# Patient Record
Sex: FEMALE | Race: WHITE | NOT HISPANIC OR LATINO | Employment: OTHER | URBAN - METROPOLITAN AREA
[De-identification: names, ages, dates, MRNs, and addresses within clinical notes are randomized per-mention and may not be internally consistent; named-entity substitution may affect disease eponyms.]

---

## 2023-09-16 ENCOUNTER — TELEPHONE (OUTPATIENT)
Dept: PRIMARY CARE | Facility: CLINIC | Age: 38
End: 2023-09-16

## 2023-09-16 ENCOUNTER — OFFICE VISIT (OUTPATIENT)
Dept: PRIMARY CARE | Facility: CLINIC | Age: 38
End: 2023-09-16
Payer: COMMERCIAL

## 2023-09-16 VITALS
BODY MASS INDEX: 25.62 KG/M2 | WEIGHT: 155.6 LBS | DIASTOLIC BLOOD PRESSURE: 68 MMHG | HEART RATE: 66 BPM | OXYGEN SATURATION: 99 % | SYSTOLIC BLOOD PRESSURE: 104 MMHG

## 2023-09-16 DIAGNOSIS — R21 RASH: Primary | ICD-10-CM

## 2023-09-16 DIAGNOSIS — G43.C0 PERIODIC HEADACHE SYNDROME, NOT INTRACTABLE: ICD-10-CM

## 2023-09-16 PROBLEM — M65.939 TENOSYNOVITIS OF WRIST: Status: ACTIVE | Noted: 2023-09-16

## 2023-09-16 PROBLEM — N83.299 OVARIAN CYST, COMPLEX: Status: ACTIVE | Noted: 2023-09-16

## 2023-09-16 PROBLEM — R87.619 ABNORMAL PAP SMEAR OF CERVIX: Status: ACTIVE | Noted: 2023-09-16

## 2023-09-16 PROBLEM — J02.9 SORE THROAT: Status: RESOLVED | Noted: 2023-09-16 | Resolved: 2023-09-16

## 2023-09-16 PROBLEM — L70.9 ACNE: Status: ACTIVE | Noted: 2023-09-16

## 2023-09-16 PROBLEM — F43.9 STRESS REACTION, EMOTIONAL: Status: ACTIVE | Noted: 2023-09-16

## 2023-09-16 PROBLEM — R11.0 NAUSEA IN ADULT: Status: RESOLVED | Noted: 2023-09-16 | Resolved: 2023-09-16

## 2023-09-16 PROBLEM — R05.9 COUGH: Status: ACTIVE | Noted: 2023-09-16

## 2023-09-16 PROBLEM — F41.8 DEPRESSION WITH ANXIETY: Status: ACTIVE | Noted: 2023-09-16

## 2023-09-16 PROBLEM — M25.561 RIGHT KNEE PAIN: Status: ACTIVE | Noted: 2023-09-16

## 2023-09-16 PROBLEM — J98.01 ACUTE BRONCHOSPASM DUE TO VIRAL INFECTION: Status: ACTIVE | Noted: 2023-09-16

## 2023-09-16 PROBLEM — R05.8 POST-VIRAL COUGH SYNDROME: Status: RESOLVED | Noted: 2023-09-16 | Resolved: 2023-09-16

## 2023-09-16 PROBLEM — J02.9 ACUTE VIRAL PHARYNGITIS: Status: ACTIVE | Noted: 2023-09-16

## 2023-09-16 PROBLEM — M79.18 MYOFASCIAL PAIN SYNDROME: Status: ACTIVE | Noted: 2023-09-16

## 2023-09-16 PROBLEM — J02.9 ACUTE VIRAL PHARYNGITIS: Status: RESOLVED | Noted: 2023-09-16 | Resolved: 2023-09-16

## 2023-09-16 PROBLEM — J02.9 SORE THROAT: Status: ACTIVE | Noted: 2023-09-16

## 2023-09-16 PROBLEM — U07.1 COVID-19: Status: RESOLVED | Noted: 2023-09-16 | Resolved: 2023-09-16

## 2023-09-16 PROBLEM — B34.9 ACUTE BRONCHOSPASM DUE TO VIRAL INFECTION: Status: ACTIVE | Noted: 2023-09-16

## 2023-09-16 PROBLEM — J98.01 ACUTE BRONCHOSPASM DUE TO VIRAL INFECTION: Status: RESOLVED | Noted: 2023-09-16 | Resolved: 2023-09-16

## 2023-09-16 PROBLEM — F41.8 SITUATIONAL ANXIETY: Status: ACTIVE | Noted: 2023-09-16

## 2023-09-16 PROBLEM — R05.9 COUGH: Status: RESOLVED | Noted: 2023-09-16 | Resolved: 2023-09-16

## 2023-09-16 PROBLEM — R05.8 POST-VIRAL COUGH SYNDROME: Status: ACTIVE | Noted: 2023-09-16

## 2023-09-16 PROBLEM — R11.0 NAUSEA IN ADULT: Status: ACTIVE | Noted: 2023-09-16

## 2023-09-16 PROBLEM — G43.909 MIGRAINE HEADACHE: Status: ACTIVE | Noted: 2023-09-16

## 2023-09-16 PROBLEM — M65.9 TENOSYNOVITIS OF WRIST: Status: ACTIVE | Noted: 2023-09-16

## 2023-09-16 PROBLEM — B34.9 ACUTE BRONCHOSPASM DUE TO VIRAL INFECTION: Status: RESOLVED | Noted: 2023-09-16 | Resolved: 2023-09-16

## 2023-09-16 PROBLEM — U07.1 COVID-19: Status: ACTIVE | Noted: 2023-09-16

## 2023-09-16 PROCEDURE — 99213 OFFICE O/P EST LOW 20 MIN: CPT | Performed by: STUDENT IN AN ORGANIZED HEALTH CARE EDUCATION/TRAINING PROGRAM

## 2023-09-16 PROCEDURE — 1036F TOBACCO NON-USER: CPT | Performed by: STUDENT IN AN ORGANIZED HEALTH CARE EDUCATION/TRAINING PROGRAM

## 2023-09-16 RX ORDER — METHYLPREDNISOLONE 4 MG/1
TABLET ORAL
Qty: 21 TABLET | Refills: 0 | Status: SHIPPED | OUTPATIENT
Start: 2023-09-16 | End: 2023-09-27 | Stop reason: ALTCHOICE

## 2023-09-16 RX ORDER — PROPRANOLOL HYDROCHLORIDE 10 MG/1
10 TABLET ORAL 3 TIMES DAILY
COMMUNITY
Start: 2023-01-24 | End: 2023-09-16 | Stop reason: SDUPTHER

## 2023-09-16 RX ORDER — PROPRANOLOL HYDROCHLORIDE 10 MG/1
10 TABLET ORAL 3 TIMES DAILY
Qty: 90 TABLET | Refills: 2 | Status: SHIPPED | OUTPATIENT
Start: 2023-09-16

## 2023-09-16 RX ORDER — ONDANSETRON 4 MG/1
4 TABLET, ORALLY DISINTEGRATING ORAL EVERY 8 HOURS PRN
Qty: 20 TABLET | Refills: 2 | Status: SHIPPED | OUTPATIENT
Start: 2023-09-16

## 2023-09-16 RX ORDER — DROSPIRENONE AND ETHINYL ESTRADIOL 0.02-3(28)
1 KIT ORAL
COMMUNITY
Start: 2018-05-22 | End: 2023-09-16 | Stop reason: ALTCHOICE

## 2023-09-16 RX ORDER — SUMATRIPTAN SUCCINATE 100 MG/1
100 TABLET ORAL DAILY PRN
COMMUNITY
Start: 2022-05-11 | End: 2023-09-16 | Stop reason: SDUPTHER

## 2023-09-16 RX ORDER — FERROUS SULFATE 325(65) MG
65 TABLET, DELAYED RELEASE (ENTERIC COATED) ORAL 2 TIMES DAILY
COMMUNITY
Start: 2019-06-08 | End: 2023-09-16 | Stop reason: ALTCHOICE

## 2023-09-16 RX ORDER — DOCUSATE SODIUM 100 MG/1
100 CAPSULE, LIQUID FILLED ORAL 2 TIMES DAILY
COMMUNITY
Start: 2019-06-08 | End: 2023-09-16 | Stop reason: ALTCHOICE

## 2023-09-16 RX ORDER — IBUPROFEN 400 MG/1
400 TABLET ORAL EVERY 6 HOURS
COMMUNITY
Start: 2019-06-08

## 2023-09-16 RX ORDER — NAPROXEN 500 MG/1
500 TABLET ORAL
COMMUNITY
Start: 2022-05-11 | End: 2023-09-27 | Stop reason: ALTCHOICE

## 2023-09-16 RX ORDER — LEVONORGESTREL 52 MG/1
1 INTRAUTERINE DEVICE INTRAUTERINE ONCE
COMMUNITY

## 2023-09-16 RX ORDER — SUMATRIPTAN SUCCINATE 100 MG/1
100 TABLET ORAL DAILY PRN
Qty: 9 TABLET | Refills: 11 | Status: SHIPPED | OUTPATIENT
Start: 2023-09-16

## 2023-09-16 RX ORDER — OXYCODONE AND ACETAMINOPHEN 5; 325 MG/1; MG/1
1 TABLET ORAL EVERY 6 HOURS PRN
COMMUNITY
Start: 2019-10-11 | End: 2023-09-16 | Stop reason: ALTCHOICE

## 2023-09-16 RX ORDER — ONDANSETRON 4 MG/1
4 TABLET, ORALLY DISINTEGRATING ORAL EVERY 8 HOURS PRN
COMMUNITY
Start: 2019-10-11 | End: 2023-09-16 | Stop reason: SDUPTHER

## 2023-09-16 RX ORDER — ALBUTEROL SULFATE 90 UG/1
2 AEROSOL, METERED RESPIRATORY (INHALATION)
COMMUNITY
Start: 2021-09-17 | End: 2023-09-27 | Stop reason: ALTCHOICE

## 2023-09-16 ASSESSMENT — ENCOUNTER SYMPTOMS: DEPRESSION: 0

## 2023-09-16 ASSESSMENT — PAIN SCALES - GENERAL: PAINLEVEL: 0-NO PAIN

## 2023-09-16 NOTE — PROGRESS NOTES
Subjective   Patient ID: Zoila Bledsoe is a 38 y.o. female who presents for Rash (Rash behind ankles.) and Med Refill.    HPI comes in for rash posterior calves x1 week    Review of Systems  Constitutional: NO F, chills, or sweats  Eyes: no blurred vision or visual disturbance  ENT: no hearing loss, no congestion, no nasal discharge, no hoarseness and no sore throat.   Cardiovascular: no chest pain, no edema, no palps and no syncope.   Respiratory: no cough,no s.o.b. and no wheezing  Gastrointestinal: no abdominal pain, No C/D no N/V, no blood in stools  Genitourinary: no dysuria, no change in urinary frequency, no urinary hesitancy and no feelings of urinary urgency.   Musculoskeletal: no arthralgias,  no back pain and no myalgias.   Integumentary: Itchy rash  Objective   /68 (BP Location: Left arm, Patient Position: Sitting, BP Cuff Size: Adult)   Pulse 66   Wt 70.6 kg (155 lb 9.6 oz)   SpO2 99%   BMI 25.62 kg/m²     Physical Exam  Derm-bilateral posterior lower calves small bug bites with inflammatory reaction  Assessment/Plan     1.  Local reaction to insect stings bilateral posterior calves.  No signs of infection.  Medrol Dosepak as directed.  Would advise on antihistamine such as Zyrtec in the morning Benadryl in the evening.    2.  Medication refill for intermittent migraines

## 2023-09-16 NOTE — PATIENT INSTRUCTIONS
1.  Local reaction to insect stings bilateral posterior calves.  No signs of infection.  Medrol Dosepak as directed.  Would advise on antihistamine such as Zyrtec in the morning Benadryl in the evening.    2.  Medication refill for intermittent migraines

## 2023-09-27 ENCOUNTER — OFFICE VISIT (OUTPATIENT)
Dept: PRIMARY CARE | Facility: CLINIC | Age: 38
End: 2023-09-27
Payer: COMMERCIAL

## 2023-09-27 VITALS
DIASTOLIC BLOOD PRESSURE: 62 MMHG | SYSTOLIC BLOOD PRESSURE: 110 MMHG | OXYGEN SATURATION: 100 % | HEIGHT: 65 IN | HEART RATE: 75 BPM | BODY MASS INDEX: 26.02 KG/M2 | WEIGHT: 156.2 LBS

## 2023-09-27 DIAGNOSIS — Z13.6 ENCOUNTER FOR SCREENING FOR CARDIOVASCULAR DISORDERS: ICD-10-CM

## 2023-09-27 DIAGNOSIS — Z13.29 SCREENING FOR THYROID DISORDER: ICD-10-CM

## 2023-09-27 DIAGNOSIS — Z00.00 WELLNESS EXAMINATION: Primary | ICD-10-CM

## 2023-09-27 DIAGNOSIS — Z13.21 ENCOUNTER FOR VITAMIN DEFICIENCY SCREENING: ICD-10-CM

## 2023-09-27 DIAGNOSIS — Z13.0 SCREENING FOR DEFICIENCY ANEMIA: ICD-10-CM

## 2023-09-27 DIAGNOSIS — G43.C0 PERIODIC HEADACHE SYNDROME, NOT INTRACTABLE: ICD-10-CM

## 2023-09-27 PROCEDURE — 1036F TOBACCO NON-USER: CPT | Performed by: STUDENT IN AN ORGANIZED HEALTH CARE EDUCATION/TRAINING PROGRAM

## 2023-09-27 PROCEDURE — 99395 PREV VISIT EST AGE 18-39: CPT | Performed by: STUDENT IN AN ORGANIZED HEALTH CARE EDUCATION/TRAINING PROGRAM

## 2023-09-27 RX ORDER — FLUCONAZOLE 150 MG/1
150 TABLET ORAL DAILY
COMMUNITY
Start: 2023-09-25 | End: 2023-12-29 | Stop reason: WASHOUT

## 2023-09-27 ASSESSMENT — PROMIS GLOBAL HEALTH SCALE
CARRYOUT_SOCIAL_ACTIVITIES: VERY GOOD
RATE_AVERAGE_PAIN: 0
RATE_QUALITY_OF_LIFE: VERY GOOD
RATE_AVERAGE_FATIGUE: MILD
RATE_GENERAL_HEALTH: VERY GOOD
RATE_SOCIAL_SATISFACTION: VERY GOOD
EMOTIONAL_PROBLEMS: RARELY
CARRYOUT_PHYSICAL_ACTIVITIES: COMPLETELY
RATE_MENTAL_HEALTH: VERY GOOD
RATE_PHYSICAL_HEALTH: VERY GOOD

## 2023-09-27 ASSESSMENT — PAIN SCALES - GENERAL: PAINLEVEL: 0-NO PAIN

## 2023-09-27 ASSESSMENT — ENCOUNTER SYMPTOMS: DEPRESSION: 0

## 2023-09-27 NOTE — PROGRESS NOTES
"Subjective   Patient ID: Zoila Bledsoe is a 38 y.o. female who presents for Annual Exam (She is not fasting.).    HPI comes in for wellness    Review of Systems  Constitutional: NO F, chills, or sweats  Eyes: no blurred vision or visual disturbance  ENT: no hearing loss, no congestion, no nasal discharge, no hoarseness and no sore throat.   Cardiovascular: no chest pain, no edema, no palps and no syncope.   Respiratory: no cough,no s.o.b. and no wheezing  Gastrointestinal: no abdominal pain, No C/D no N/V, no blood in stools  Genitourinary: no dysuria, no change in urinary frequency, no urinary hesitancy and no feelings of urinary urgency.   Musculoskeletal: no arthralgias,  no back pain and no myalgias.   Integumentary: no new skin lesions and no rashes.   Neurological: no difficulty walking, no headache, no limb weakness, no numbness and no tingling.   Psychiatric: no anxiety, no depression, no anhedonia and no substance use disorders.   Endocrine: no recent weight gain and no recent weight loss.   Hematologic/Lymphatic: no tendency for easy bruising and no swollen glands.  Objective   /62 (BP Location: Right arm, Patient Position: Sitting, BP Cuff Size: Adult)   Pulse 75   Ht 1.66 m (5' 5.35\")   Wt 70.9 kg (156 lb 3.2 oz)   SpO2 100%   BMI 25.71 kg/m²     Physical Exam  gen- a & o x 3, nad, pleasant  heent- eomi, perrla, ear canals patent, TM's non-erythematous, no fluid, frontal and maxillary sinus's nontender  neck- supple, nontender, no palpable or enlarged nodes, no thyromegaly  heart- rrr, no murmurs  lungs- cta b/l , no w/r/r  chest- symmetric, nontender  ab- soft, nontender, no palpable organomegaly, postive bowel sounds  ex's- no c/c/e  neuro- CNs 2-12 grossly intact, full sensation and strength in all extremities    Assessment/Plan     1.  Wellness visit.  No concerns on exam.  Screening labs ordered today.  She will stay up-to-date with her GYN Paps every 2 to 3 years.     "

## 2023-09-27 NOTE — PATIENT INSTRUCTIONS
1.  Wellness visit.  No concerns on exam.  Screening labs ordered today.  She will stay up-to-date with her GYN Paps every 2 to 3 years.

## 2023-10-03 ENCOUNTER — TELEPHONE (OUTPATIENT)
Dept: PRIMARY CARE | Facility: CLINIC | Age: 38
End: 2023-10-03
Payer: COMMERCIAL

## 2023-12-28 PROBLEM — N83.299 OVARIAN CYST, COMPLEX: Status: RESOLVED | Noted: 2023-09-16 | Resolved: 2023-12-28

## 2023-12-28 PROBLEM — R87.619 ABNORMAL PAP SMEAR OF CERVIX: Status: RESOLVED | Noted: 2023-09-16 | Resolved: 2023-12-28

## 2023-12-28 PROBLEM — F41.8 DEPRESSION WITH ANXIETY: Status: RESOLVED | Noted: 2023-09-16 | Resolved: 2023-12-28

## 2023-12-29 ENCOUNTER — PROCEDURE VISIT (OUTPATIENT)
Dept: OBSTETRICS AND GYNECOLOGY | Facility: CLINIC | Age: 38
End: 2023-12-29
Payer: COMMERCIAL

## 2023-12-29 VITALS
SYSTOLIC BLOOD PRESSURE: 102 MMHG | HEIGHT: 65 IN | WEIGHT: 155 LBS | DIASTOLIC BLOOD PRESSURE: 78 MMHG | BODY MASS INDEX: 25.83 KG/M2

## 2023-12-29 DIAGNOSIS — Z01.419 WELL WOMAN EXAM WITH ROUTINE GYNECOLOGICAL EXAM: Primary | ICD-10-CM

## 2023-12-29 PROCEDURE — 88175 CYTOPATH C/V AUTO FLUID REDO: CPT

## 2023-12-29 PROCEDURE — 87624 HPV HI-RISK TYP POOLED RSLT: CPT

## 2023-12-29 PROCEDURE — 99395 PREV VISIT EST AGE 18-39: CPT | Performed by: OBSTETRICS & GYNECOLOGY

## 2023-12-29 NOTE — PROGRESS NOTES
Chief Complaint   Patient presents with    Annual Exam     Annual Exam with Pap Smear and Order for Mammogram      Mirena    C/O right breast lump x2 weeks; intermittent pain.    Chaperone declined.       Patient presents for annual care.  She has noted, over the last 2 weeks, some tenderness and prominence of breast tissue in the upper right breast.  She is amenorrheic as she has Mirena.  Therefore has no sense of where she would be in her menstrual cycle.  Denies nipple discharge, breast rash, skin changes.    REVIEW OF SYSTEMS    Please see HPI for reported pertinent positives, which would supersede this ROS    Constitutional:  Denies fever, chills, wt gain or loss, fatigue    Genito-Urinary:  Denies genital lesion or sores, vaginal dryness, itching  or pain.  No abnormal vaginal discharge or unexplained vaginal bleeding.  No dysuria, urinary incontinence or frequency.  Denies pelvic pain, dysmenorrhea or dyspareunia.    Eyes:  Denies vision changes, dryness  ENT:  No hearing loss, sinus pain or congestion, nosebleeds  Cardiovascular:  No chest pain or palpitations  Respiratory:  No SOB, cough, wheezing  GI:  No Nausea, vomiting, diarrhea, constipation, abdominal pain  Musculoskeletal:  No new back pain. joint pain, peripheral edema  Skin:  No rash or skin lesion  Neurologic:  No HA, numbness or dizziness  Psychiatric:  No new anxiety or depression  Endocrine:  No loss of hair or hirsutism  Hematologic/lymphatic:  No swollen lymph nodes.  No reported tendency for easy bruising or bleeding    Patient admits to no other systemic complaints      Vitals:    23 1235   BP: 102/78       PHYSICAL EXAM:    PSYCH:  Pt is alert, oriented and cooperative    SKIN: warm, dry, w/o lesion    HEAD AND FACE:  External inspection of eyes, ears, functional cranial nerves normal and intact    THYROID:  No thyromegaly    CARDIOVASCULAR:  Warm and well Perfused    PULMONARY:  No respiratory distress    ABDOMEN:  soft,  nontender.  No mass or organomegaly.      BREAST:  Breasts are symmetric to inspection.  No dominant mass palpable bilaterally.   In the right upper breast at approximately 12:00, there is a 1 to 2 cm area which feels like discoid breast gland elements.  This is also present on the left but slightly more prominent on the right.  This is nontender today.  There is no axillary lymphadenopathy    PELVIC:    External genitalia, urethra, perianal region normal to inspection and palpation if indicated.  No inguinal LA    Vagina without lesions.    Cervix seen and visually normal      Bimanual exam:      No pelvic mass palpable    Uterus nontender, midline in pelvis    No adnexal masses or tenderness    Assessment/Plan    Diagnoses and all orders for this visit:  Well woman exam with routine gynecological exam  -     THINPREP PAP TEST    Recent breast tenderness and prominent gland elements on the right.  We discussed that the evaluation of this would typically involve clinical exam followed by imaging.  That said, if this is due to cyclic hormonal changes throughout her menstrual cycle, we would anticipate resolution in the next 1 to 2 weeks.  Towards that end, through shared decision-making, she feels that if all of her symptoms entirely resolved she will follow-up as needed.  If the symptoms persist in the next 2 weeks, to reach out to me and we will order diagnostic imaging.

## 2024-01-22 ENCOUNTER — OFFICE VISIT (OUTPATIENT)
Dept: NEUROLOGY | Facility: CLINIC | Age: 39
End: 2024-01-22
Payer: COMMERCIAL

## 2024-01-22 VITALS
SYSTOLIC BLOOD PRESSURE: 122 MMHG | TEMPERATURE: 96.9 F | WEIGHT: 159.8 LBS | BODY MASS INDEX: 26.62 KG/M2 | HEART RATE: 80 BPM | RESPIRATION RATE: 16 BRPM | HEIGHT: 65 IN | DIASTOLIC BLOOD PRESSURE: 76 MMHG

## 2024-01-22 DIAGNOSIS — G44.201 INTRACTABLE TENSION-TYPE HEADACHE, UNSPECIFIED CHRONICITY PATTERN: Primary | ICD-10-CM

## 2024-01-22 DIAGNOSIS — G43.E09 CHRONIC MIGRAINE WITH AURA WITHOUT STATUS MIGRAINOSUS, NOT INTRACTABLE: ICD-10-CM

## 2024-01-22 LAB
CYTOLOGY CMNT CVX/VAG CYTO-IMP: NORMAL
HPV HR 12 DNA GENITAL QL NAA+PROBE: NEGATIVE
HPV HR GENOTYPES PNL CVX NAA+PROBE: NEGATIVE
HPV16 DNA SPEC QL NAA+PROBE: NEGATIVE
HPV18 DNA SPEC QL NAA+PROBE: NEGATIVE
LAB AP CONTRACEPTIVE HISTORY: NORMAL
LAB AP HPV GENOTYPE QUESTION: YES
LAB AP HPV HR: NORMAL
LABORATORY COMMENT REPORT: NORMAL
PATH REPORT.TOTAL CANCER: NORMAL

## 2024-01-22 PROCEDURE — 99205 OFFICE O/P NEW HI 60 MIN: CPT | Performed by: PSYCHIATRY & NEUROLOGY

## 2024-01-22 PROCEDURE — 1036F TOBACCO NON-USER: CPT | Performed by: PSYCHIATRY & NEUROLOGY

## 2024-01-22 ASSESSMENT — ENCOUNTER SYMPTOMS: HEADACHES: 1

## 2024-01-22 NOTE — PATIENT INSTRUCTIONS
The patient needs an MRI and MRA of the brain both without contrast.    The patient could not tolerate sumatriptan.  I will start the patient on Ubrelvy 100 mg tabs to be taken at headache onset.  I did warn her of the possibility of nausea, dizziness and drowsiness with this medicine.  At this point, the patient is not having frequent enough headaches to warrant preventative therapy.  I discussed all these issues in detail with the patient and answered all of her questions.  The patient will follow-up with me in 6 months.

## 2024-01-22 NOTE — PROGRESS NOTES
Subjective     Zoila Bledsoe 38 y.o.  HPI  The patient has been having headaches for approximately 3 years.  She states that she will get a pain behind her right eye that she describes as dull.  The patient states that the pain will then spread to involve the base of her neck bilaterally and then ultimately her entire head.  She states that the pain will feel like razors at some point.  She rates the pain at approximately a an 8-10 out of 10 in severity.  She will have nausea and vomiting with the pain.  She will have both photo and phonophobia.  The patient states that head movement or body movement does not make the pain worse.  The patient will need to go in a dark quiet room.  The patient has no triggers for her headaches.  She states that the headache will last approximately 3 days.  The patient did have Nurtec prescribed but her insurance company would not approve it.  The patient is currently on sumatriptan 100 mg tabs.  She states that if she takes it early enough in the course of her headaches it will relieve the headaches but when she does take it she will get a stomach upset.  At first the patient would get headaches every 6 months then the frequency increased to every 3 months and now it is every month.  The patient has had no neuroimaging.    Review of Systems   Neurological:  Positive for headaches.   All other systems reviewed and are negative.       Patient Active Problem List   Diagnosis    Acne    Rash    Migraine headache    Myofascial pain syndrome    Right knee pain    Situational anxiety    Stress reaction, emotional    Tenosynovitis of wrist        Past Medical History:   Diagnosis Date    13 weeks gestation of pregnancy 11/27/2018    13 weeks gestation of pregnancy    21 weeks gestation of pregnancy 01/22/2019    21 weeks gestation of pregnancy    25 weeks gestation of pregnancy 02/20/2019    25 weeks gestation of pregnancy    30 weeks gestation of pregnancy 03/26/2019    30 weeks gestation of  pregnancy    36 weeks gestation of pregnancy 2019    36 weeks gestation of pregnancy    39 weeks gestation of pregnancy 2019    39 weeks gestation of pregnancy    Abnormal Pap smear of cervix 2023    Contact with and (suspected) exposure to covid-19 2021    Exposure to 2019 novel coronavirus    Encounter for  screening for Streptococcus B 2019     screening for streptococcus B    Encounter for immunization 04/10/2019    Need for Tdap vaccination    Encounter for insertion of intrauterine contraceptive device 2019    Encounter for insertion of mirena IUD    Encounter for pregnancy test, result positive 10/19/2018    Positive urine pregnancy test    Encounter for routine postpartum follow-up 2019    Encounter for routine postpartum follow-up    Encounter for screening for malignant neoplasm of cervix 2022    Cervical cancer screening    Encounter for supervision of normal first pregnancy, first trimester 2018    Encounter for supervision of normal first pregnancy in first trimester    Encounter for supervision of normal first pregnancy, second trimester 2019    Encounter for supervision of normal first pregnancy in second trimester    Encounter for supervision of normal first pregnancy, second trimester 2018    Pregnancy, first, second trimester    Encounter for supervision of normal first pregnancy, third trimester 2019    Encounter for supervision of normal first pregnancy in third trimester    Encounter for supervision of normal first pregnancy, unspecified trimester 2019    Encounter for supervision of normal first pregnancy    Less than 8 weeks gestation of pregnancy 10/19/2018    7 weeks gestation of pregnancy    Other conditions influencing health status 2019    History of dyspareunia    Other conditions influencing health status 2019    Normal breast feeding    Other conditions influencing health status  2021    History of cough    Ovarian cyst, complex 2023    Pain in unspecified knee 2020    Acute knee pain    Personal history of other diseases of the musculoskeletal system and connective tissue 2018    History of neck pain    Personal history of other drug therapy 10/19/2018    History of influenza vaccination    Personal history of urinary (tract) infections 2021    History of urinary tract infection    Vomiting of pregnancy, unspecified 10/19/2018    Nausea/vomiting in pregnancy        Past Surgical History:   Procedure Laterality Date    ANKLE SURGERY Right      SECTION, LOW TRANSVERSE          Social History     Socioeconomic History    Marital status:      Spouse name: Not on file    Number of children: Not on file    Years of education: Not on file    Highest education level: Not on file   Occupational History    Not on file   Tobacco Use    Smoking status: Former     Types: Cigarettes     Passive exposure: Past    Smokeless tobacco: Never   Vaping Use    Vaping Use: Never used   Substance and Sexual Activity    Alcohol use: Yes     Comment: occasionally    Drug use: Never    Sexual activity: Yes     Partners: Male     Birth control/protection: I.U.D.   Other Topics Concern    Not on file   Social History Narrative    Not on file     Social Determinants of Health     Financial Resource Strain: Not on file   Food Insecurity: Not on file   Transportation Needs: Not on file   Physical Activity: Not on file   Stress: Not on file   Social Connections: Not on file   Intimate Partner Violence: Not on file   Housing Stability: Not on file        Family History   Problem Relation Name Age of Onset    Osteoarthritis Mother          Current Outpatient Medications   Medication Instructions    ibuprofen 400 mg, oral, Every 6 hours    levonorgestrel (Mirena) 21 mcg/24 hours (8 yrs) 52 mg IUD 1 each, intrauterine, Once    ondansetron ODT (ZOFRAN-ODT) 4 mg, oral, Every 8 hours  "PRN    propranolol (INDERAL) 10 mg, oral, 3 times daily    rimegepant (NURTEC ODT) 75 mg, oral, Every other day    SUMAtriptan (IMITREX) 100 mg, oral, Daily PRN        Allergies   Allergen Reactions    Codeine Nausea/vomiting        Objective  /76 (BP Location: Right arm)   Pulse 80   Temp 36.1 °C (96.9 °F)   Resp 16   Ht 1.651 m (5' 5\")   Wt 72.5 kg (159 lb 12.8 oz)   LMP  (LMP Unknown) Comment: NELLIE  BMI 26.59 kg/m²    GENERAL APPEARANCE:  No distress, alert and cooperative.      CARDIOVASCULAR: Regular, rate and rhythm, without murmur. No carotid bruits. Pulses +2 and equal in all extremities. No edema, or tenderness to palpation.     MENTAL STATE:  Orientation was normal to time, place and person. Recent and remote memory was intact.  Attention span and concentration were normal. Language testing was normal for comprehension, repetition, expression, and naming. Calculation was intact. The patient could correctly interpret a picture, and copy a diagram. General fund of knowledge was intact. Mini-mental status examination was performed with no errors.      OPHTHALMOSCOPIC: The ophthalmoscopic exam normal. The fundi were well visualized with normal disc margins, clear vessels and vascular pulsations. No disc edema. The cup/disk ratio was not enlarged. No hemorrhages or exudates were present in the posterior segments that were visualized.      CRANIAL NERVES:  Cranial nerves were normal.      CN 2- Visual Acuity  OD: 20/20 (corrected)   OS: 20/20 (corrected); visual fields full to confrontation.      CN 3, 4, 6-  Pupils round, 4 mm in diameter, equally reactive to light. No ptosis. EOMs normal alignment, full range of movement, no nystagmus     CN 5- Facial sensation intact bilaterally. Normal corneal reflexes.      CN 7- Normal and symmetric facial strength. Nasolabial folds symmetric.     CN 8- Hearing intact to finger rub, whisper.      CN 9- Palate elevates symmetrically. Normal gag reflex.      " CN 11- Normal strength of shoulder shrug and neck turning      CN 12- Tongue midline, with normal bulk and strength; no fasciculations.      MOTOR:  Motor exam was normal. Muscle bulk and tone were normal in both upper and lower extremities. Muscle strength was 5/5 in distal and proximal muscles in both upper and lower extremities. No fasciculations, tremor or other abnormal movements were present.      REFLEXES:  Right/ Left:  Biceps 2/2, brachioradialis 2/2, triceps 2/2, patellar 2/2, ankle 2/2  Babinski: toes downgoing to plantar stimulation. No clonus, frontal release signs or other pathologic reflexes present.      SENSORY: Sensory exam was intact to light touch, sharp/dull, vibration and position sense in both UE and LE.      COORDINATION: CRISTOFER were intact in upper and lower extremities.  In UE- finger-nose-finger was intact and in LE- heel-to-shin was intact without dysmetria or overshoot.       GAIT: Station was stable with a normal base and negative Romberg sign. Gait was stable with a normal arm swing and speed. No ataxia, shuffling, steppage or waddling was noted. Tandem gait was intact. Postural reflexes were normal.     Assessment/Plan   Impression: The patient has a history of headaches that developed about 3 years ago.  Her neurological examination is normal.  The differential diagnosis for headaches includes migraine, cervicogenic headaches, brain tumor, cerebral infarction, hydrocephalus and occipital neuralgia.    Plan: The patient needs an MRI and MRA of the brain both without contrast.    The patient could not tolerate sumatriptan.  I will start the patient on Ubrelvy 100 mg tabs to be taken at headache onset.  I did warn her of the possibility of nausea, dizziness and drowsiness with this medicine.  At this point, the patient is not having frequent enough headaches to warrant preventative therapy.  I discussed all these issues in detail with the patient and answered all of her questions.  The  patient will follow-up with me in 6 months.

## 2024-02-09 ENCOUNTER — HOSPITAL ENCOUNTER (OUTPATIENT)
Dept: RADIOLOGY | Facility: HOSPITAL | Age: 39
Discharge: HOME | End: 2024-02-09
Payer: COMMERCIAL

## 2024-02-09 DIAGNOSIS — G44.201 INTRACTABLE TENSION-TYPE HEADACHE, UNSPECIFIED CHRONICITY PATTERN: ICD-10-CM

## 2024-02-09 PROCEDURE — 70551 MRI BRAIN STEM W/O DYE: CPT | Performed by: STUDENT IN AN ORGANIZED HEALTH CARE EDUCATION/TRAINING PROGRAM

## 2024-02-09 PROCEDURE — 70544 MR ANGIOGRAPHY HEAD W/O DYE: CPT | Mod: 59

## 2024-02-09 PROCEDURE — 70544 MR ANGIOGRAPHY HEAD W/O DYE: CPT | Performed by: STUDENT IN AN ORGANIZED HEALTH CARE EDUCATION/TRAINING PROGRAM

## 2024-02-09 PROCEDURE — 70551 MRI BRAIN STEM W/O DYE: CPT

## 2024-05-23 ENCOUNTER — HOSPITAL ENCOUNTER (EMERGENCY)
Facility: HOSPITAL | Age: 39
Discharge: HOME | End: 2024-05-23
Attending: STUDENT IN AN ORGANIZED HEALTH CARE EDUCATION/TRAINING PROGRAM
Payer: COMMERCIAL

## 2024-05-23 ENCOUNTER — APPOINTMENT (OUTPATIENT)
Dept: RADIOLOGY | Facility: HOSPITAL | Age: 39
End: 2024-05-23
Payer: COMMERCIAL

## 2024-05-23 VITALS
OXYGEN SATURATION: 96 % | BODY MASS INDEX: 24.16 KG/M2 | SYSTOLIC BLOOD PRESSURE: 116 MMHG | HEART RATE: 86 BPM | DIASTOLIC BLOOD PRESSURE: 73 MMHG | HEIGHT: 65 IN | WEIGHT: 145 LBS | RESPIRATION RATE: 18 BRPM | TEMPERATURE: 97 F

## 2024-05-23 DIAGNOSIS — M25.552 ACUTE HIP PAIN, LEFT: ICD-10-CM

## 2024-05-23 DIAGNOSIS — Z04.1 EXAM FOLLOWING MVC (MOTOR VEHICLE COLLISION), NO APPARENT INJURY: Primary | ICD-10-CM

## 2024-05-23 DIAGNOSIS — M54.50 ACUTE MIDLINE LOW BACK PAIN WITHOUT SCIATICA: ICD-10-CM

## 2024-05-23 PROCEDURE — 96372 THER/PROPH/DIAG INJ SC/IM: CPT

## 2024-05-23 PROCEDURE — 99285 EMERGENCY DEPT VISIT HI MDM: CPT

## 2024-05-23 PROCEDURE — 72128 CT CHEST SPINE W/O DYE: CPT | Performed by: RADIOLOGY

## 2024-05-23 PROCEDURE — 2500000004 HC RX 250 GENERAL PHARMACY W/ HCPCS (ALT 636 FOR OP/ED)

## 2024-05-23 PROCEDURE — 99284 EMERGENCY DEPT VISIT MOD MDM: CPT | Performed by: STUDENT IN AN ORGANIZED HEALTH CARE EDUCATION/TRAINING PROGRAM

## 2024-05-23 PROCEDURE — 72131 CT LUMBAR SPINE W/O DYE: CPT | Performed by: RADIOLOGY

## 2024-05-23 PROCEDURE — 72128 CT CHEST SPINE W/O DYE: CPT

## 2024-05-23 PROCEDURE — 73502 X-RAY EXAM HIP UNI 2-3 VIEWS: CPT | Mod: LEFT SIDE | Performed by: RADIOLOGY

## 2024-05-23 PROCEDURE — 72131 CT LUMBAR SPINE W/O DYE: CPT

## 2024-05-23 PROCEDURE — 73502 X-RAY EXAM HIP UNI 2-3 VIEWS: CPT | Mod: LT

## 2024-05-23 RX ORDER — ACETAMINOPHEN 500 MG
1000 TABLET ORAL EVERY 6 HOURS PRN
Qty: 30 TABLET | Refills: 0 | Status: SHIPPED | OUTPATIENT
Start: 2024-05-23 | End: 2024-06-02

## 2024-05-23 RX ORDER — TIZANIDINE 2 MG/1
2 TABLET ORAL EVERY 6 HOURS PRN
Qty: 30 TABLET | Refills: 0 | Status: SHIPPED | OUTPATIENT
Start: 2024-05-23 | End: 2024-06-02

## 2024-05-23 RX ORDER — KETOROLAC TROMETHAMINE 15 MG/ML
15 INJECTION, SOLUTION INTRAMUSCULAR; INTRAVENOUS ONCE
Status: COMPLETED | OUTPATIENT
Start: 2024-05-23 | End: 2024-05-23

## 2024-05-23 RX ORDER — LIDOCAINE 50 MG/G
1 PATCH TOPICAL DAILY
Qty: 5 PATCH | Refills: 0 | Status: SHIPPED | OUTPATIENT
Start: 2024-05-23 | End: 2024-05-23 | Stop reason: WASHOUT

## 2024-05-23 RX ORDER — ACETAMINOPHEN 325 MG/1
975 TABLET ORAL ONCE
Status: COMPLETED | OUTPATIENT
Start: 2024-05-23 | End: 2024-05-23

## 2024-05-23 RX ORDER — NAPROXEN 500 MG/1
500 TABLET ORAL
Qty: 60 TABLET | Refills: 0 | Status: SHIPPED | OUTPATIENT
Start: 2024-05-23 | End: 2025-05-23

## 2024-05-23 RX ADMIN — KETOROLAC TROMETHAMINE 15 MG: 15 INJECTION, SOLUTION INTRAMUSCULAR; INTRAVENOUS at 18:29

## 2024-05-23 RX ADMIN — ACETAMINOPHEN 975 MG: 325 TABLET ORAL at 18:29

## 2024-05-23 ASSESSMENT — LIFESTYLE VARIABLES
EVER FELT BAD OR GUILTY ABOUT YOUR DRINKING: NO
HAVE YOU EVER FELT YOU SHOULD CUT DOWN ON YOUR DRINKING: NO
HAVE PEOPLE ANNOYED YOU BY CRITICIZING YOUR DRINKING: NO
EVER HAD A DRINK FIRST THING IN THE MORNING TO STEADY YOUR NERVES TO GET RID OF A HANGOVER: NO
TOTAL SCORE: 0

## 2024-05-23 ASSESSMENT — COLUMBIA-SUICIDE SEVERITY RATING SCALE - C-SSRS
2. HAVE YOU ACTUALLY HAD ANY THOUGHTS OF KILLING YOURSELF?: NO
1. IN THE PAST MONTH, HAVE YOU WISHED YOU WERE DEAD OR WISHED YOU COULD GO TO SLEEP AND NOT WAKE UP?: NO
6. HAVE YOU EVER DONE ANYTHING, STARTED TO DO ANYTHING, OR PREPARED TO DO ANYTHING TO END YOUR LIFE?: NO

## 2024-05-23 NOTE — DISCHARGE INSTRUCTIONS
Seek immediate medical attention for:  Loss of bladder or bowel control, weakness, fevers, shortness of breath, numbness, or any worsening or concerning symptoms.

## 2024-05-23 NOTE — Clinical Note
Zoila Bledsoe was seen and treated in our emergency department on 5/23/2024.  She may return to work on 05/24/2024.       If you have any questions or concerns, please don't hesitate to call.      Marek Olivera MD
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
## 2024-07-22 ENCOUNTER — APPOINTMENT (OUTPATIENT)
Dept: NEUROLOGY | Facility: CLINIC | Age: 39
End: 2024-07-22
Payer: COMMERCIAL

## 2024-11-13 ENCOUNTER — OFFICE VISIT (OUTPATIENT)
Dept: URGENT CARE | Age: 39
End: 2024-11-13
Payer: COMMERCIAL

## 2024-11-13 VITALS
BODY MASS INDEX: 24.13 KG/M2 | SYSTOLIC BLOOD PRESSURE: 131 MMHG | RESPIRATION RATE: 16 BRPM | TEMPERATURE: 99.7 F | OXYGEN SATURATION: 99 % | DIASTOLIC BLOOD PRESSURE: 80 MMHG | HEART RATE: 103 BPM | WEIGHT: 145 LBS

## 2024-11-13 DIAGNOSIS — J06.9 ACUTE UPPER RESPIRATORY INFECTION: Primary | ICD-10-CM

## 2024-11-13 PROCEDURE — 99213 OFFICE O/P EST LOW 20 MIN: CPT | Performed by: PHYSICIAN ASSISTANT

## 2024-11-13 PROCEDURE — 1036F TOBACCO NON-USER: CPT | Performed by: PHYSICIAN ASSISTANT

## 2024-11-13 RX ORDER — ALBUTEROL SULFATE 90 UG/1
INHALANT RESPIRATORY (INHALATION)
Qty: 8.5 G | Refills: 0 | Status: SHIPPED | OUTPATIENT
Start: 2024-11-13

## 2024-11-13 RX ORDER — AZITHROMYCIN 250 MG/1
TABLET, FILM COATED ORAL
Qty: 6 TABLET | Refills: 0 | Status: SHIPPED | OUTPATIENT
Start: 2024-11-13 | End: 2024-11-18

## 2024-11-13 ASSESSMENT — PAIN SCALES - GENERAL: PAINLEVEL_OUTOF10: 0-NO PAIN

## 2024-11-13 NOTE — LETTER
November 13, 2024     Patient: Zoila Bledsoe   YOB: 1985   Date of Visit: 11/13/2024       To Whom It May Concern:    Zoila Bledsoe was seen in my clinic on 11/13/2024 at 8:00 am. Please excuse Zoila for her absence from work on this day to make the appointment. She will return tomorrow 11/14/2024.    If you have any questions or concerns, please don't hesitate to call.         Sincerely,         Lisa Oconnor PA-C        CC: No Recipients

## 2024-11-13 NOTE — PATIENT INSTRUCTIONS
Upper Respiratory Infection     An upper respiratory infection are caused by viruses but sometimes they cause secondary bacterial infections. It can cause cough, congestion, runny nose, sore throat, and fever. You are contagious. Fever medicines can reduce fever and pain. Virus cannot be cured by an antibiotic. The body's immune system will fight off the virus. Upper Respiratory Illnesses usually improves in 7 - 10 days, but coughs can last for several weeks. If your symptoms worsen after 10 - 14 days you may have a bacterial infection.      Home Care:   1. You can use acetaminophen (paracetamol, Tylenol) for fever or sore throat.   2. You might use a cool mist humidifier/vaporizer in your room if the air is dry.   3. Sleeping in a more upright position can be helpful.    4. Follow up with your primary care for continued symptoms.    See your doctor for a recheck visit tomorrow or as soon as possible if not better.     Call your doctor or go to the emergency department if worse or:  1. Breathing trouble occurs.   2. Color is pale, bluish, or gray.   3. Chest pain occurs  4. No urination occurs in 12 hours.   5. Fever lasts for more than 2 days.

## 2024-11-13 NOTE — PROGRESS NOTES
Subjective   Patient ID: Zoila Bledsoe is a 39 y.o. female. They present today with a chief complaint of Cough (X 10 days).    History of Present Illness  HPI  Patient presents to the urgent care is a 39-year-old female with a 10-day history of cough.  Patient reports her cough is mostly dry, occasionally productive with white sputum.  Patient states she has felt a little fatigued, denies any chest heaviness, pain, or shortness of breath, or sinus congestion.    Past Medical History  Allergies as of 2024 - Reviewed 2024   Allergen Reaction Noted    Codeine Nausea/vomiting 2023       (Not in a hospital admission)       Past Medical History:   Diagnosis Date    13 weeks gestation of pregnancy ([facility]) 2018    13 weeks gestation of pregnancy    21 weeks gestation of pregnancy ([facility]) 2019    21 weeks gestation of pregnancy    25 weeks gestation of pregnancy ([facility]) 2019    25 weeks gestation of pregnancy    30 weeks gestation of pregnancy ([facility]) 2019    30 weeks gestation of pregnancy    36 weeks gestation of pregnancy ([facility]) 2019    36 weeks gestation of pregnancy    39 weeks gestation of pregnancy ([facility]) 2019    39 weeks gestation of pregnancy    Abnormal Pap smear of cervix 2023    Contact with and (suspected) exposure to covid-19 2021    Exposure to 2019 novel coronavirus    Encounter for  screening for Streptococcus B 2019     screening for streptococcus B    Encounter for immunization 04/10/2019    Need for Tdap vaccination    Encounter for insertion of intrauterine contraceptive device 2019    Encounter for insertion of mirena IUD    Encounter for pregnancy test, result positive ([facility]) 10/19/2018    Positive urine pregnancy test    Encounter for routine postpartum follow-up 2019    Encounter for routine postpartum follow-up    Encounter for screening for malignant neoplasm of cervix 2022     Cervical cancer screening    Encounter for supervision of normal first pregnancy, first trimester 2018    Encounter for supervision of normal first pregnancy in first trimester    Encounter for supervision of normal first pregnancy, second trimester 2019    Encounter for supervision of normal first pregnancy in second trimester    Encounter for supervision of normal first pregnancy, second trimester 2018    Pregnancy, first, second trimester    Encounter for supervision of normal first pregnancy, third trimester 2019    Encounter for supervision of normal first pregnancy in third trimester    Encounter for supervision of normal first pregnancy, unspecified trimester ([facility]) 2019    Encounter for supervision of normal first pregnancy    Less than 8 weeks gestation of pregnancy ([facility]) 10/19/2018    7 weeks gestation of pregnancy    Other conditions influencing health status 2019    History of dyspareunia    Other conditions influencing health status 2019    Normal breast feeding    Other conditions influencing health status 2021    History of cough    Ovarian cyst, complex 2023    Pain in unspecified knee 2020    Acute knee pain    Personal history of other diseases of the musculoskeletal system and connective tissue 2018    History of neck pain    Personal history of other drug therapy 10/19/2018    History of influenza vaccination    Personal history of urinary (tract) infections 2021    History of urinary tract infection    Vomiting of pregnancy, unspecified 10/19/2018    Nausea/vomiting in pregnancy       Past Surgical History:   Procedure Laterality Date    ANKLE SURGERY Right      SECTION, LOW TRANSVERSE          reports that she has quit smoking. Her smoking use included cigarettes. She has been exposed to tobacco smoke. She has never used smokeless tobacco. She reports current alcohol use. She reports that she does not use  drugs.    Review of Systems  Review of Systems  Patient denies sore throat, fever, nausea, diarrhea, vomiting, rash, dizziness, shortness of breath, increased urinary frequency, chills, peripheral edema, abdominal pain, chest pain.                             Objective    Vitals:    11/13/24 0814   BP: 131/80   Pulse: 103   Resp: 16   Temp: 37.6 °C (99.7 °F)   TempSrc: Oral   SpO2: 99%   Weight: 65.8 kg (145 lb)     No LMP recorded. (Menstrual status: IUD).    Physical Exam  Appearance: Alert, oriented, cooperative, in no acute distress. Well nourished & well hydrated.    Skin: Intact, no lesions, rash, petechiae or purpura.     Eyes: Conjunctiva pink with no redness or exudates. Eyelids without lesions. No scleral icterus.     ENT: Hearing grossly intact. External inspection of ears without lesions or erythema. no nasal flaring. no tripoding, no drooling, no difficulty swallowing oral secretions.    Pulmonary: good chest wall excursion. No rales, or wheezing, faint expiratory rhonchi in left lower lobe, clear to auscultation in all other lobes. No accessory muscle use or stridor. No difficulty completing a full sentence without taking a breath, no labored breathing w ambulation     Cardiac: Normal S1, S2, no rub, gallop. No JVD.    Musculoskeletal: no edema, or deformity. No cyanosis, clubbing.    Neurological: no focal findings identified.    Psychiatric: Appropriate mood and affect.    Procedures    Point of Care Test & Imaging Results from this visit  No results found for this visit on 11/13/24.   No results found.    Diagnostic study results (if any) were reviewed by Lisa Oconnor PA-C.    Assessment/Plan   Allergies, medications, history, and pertinent labs/EKGs/Imaging reviewed by Lisa Oconnor PA-C.     Medical Decision Making  Considerations for patient´s symptoms include viral/bacterial infection, reactive airway and seasonal allergies.  Patient's vital signs overall stable, mildly tachycardic,  heart rate 103, temp 99.7, oxygen saturation on room air 99%.  Given symptom onset/length, progression, and lack of systemic viral symptoms at this time, bacterial URI considered the most likely cause. Patient will begin treatment with azithromycin, and albuterol due to increased incidence of mycoplasma pneumonia in the community.  Discussed chest x-ray with patient, she agrees it is not necessary at this time. If symptoms are not improving or if they are worsening the patient will call their primary care physician and go to the ER. Patient verbalizes understanding and agrees with plan of care. All questions were answered.    Dictation software was used in the creation of this note which does not evaluate or correct for typographical, spelling, syntax or grammatical errors.      Orders and Diagnoses  There are no diagnoses linked to this encounter.    Medical Admin Record      Patient disposition: Home    Electronically signed by Lisa Oconnor PA-C  8:22 AM

## 2024-12-09 ENCOUNTER — OFFICE VISIT (OUTPATIENT)
Dept: PRIMARY CARE | Facility: CLINIC | Age: 39
End: 2024-12-09
Payer: COMMERCIAL

## 2024-12-09 VITALS
HEART RATE: 66 BPM | TEMPERATURE: 98.1 F | BODY MASS INDEX: 24.76 KG/M2 | OXYGEN SATURATION: 98 % | WEIGHT: 148.6 LBS | SYSTOLIC BLOOD PRESSURE: 112 MMHG | DIASTOLIC BLOOD PRESSURE: 70 MMHG | HEIGHT: 65 IN

## 2024-12-09 DIAGNOSIS — M25.561 CHRONIC PAIN OF RIGHT KNEE: ICD-10-CM

## 2024-12-09 DIAGNOSIS — Z00.00 ANNUAL PHYSICAL EXAM: Primary | ICD-10-CM

## 2024-12-09 DIAGNOSIS — G89.29 CHRONIC PAIN OF RIGHT KNEE: ICD-10-CM

## 2024-12-09 DIAGNOSIS — G43.C0 PERIODIC HEADACHE SYNDROME, NOT INTRACTABLE: ICD-10-CM

## 2024-12-09 PROBLEM — M65.939 TENOSYNOVITIS OF WRIST: Status: RESOLVED | Noted: 2023-09-16 | Resolved: 2024-12-09

## 2024-12-09 PROBLEM — L70.9 ACNE: Status: RESOLVED | Noted: 2023-09-16 | Resolved: 2024-12-09

## 2024-12-09 PROBLEM — R21 RASH: Status: RESOLVED | Noted: 2023-09-16 | Resolved: 2024-12-09

## 2024-12-09 PROCEDURE — 3008F BODY MASS INDEX DOCD: CPT | Performed by: FAMILY MEDICINE

## 2024-12-09 PROCEDURE — 99385 PREV VISIT NEW AGE 18-39: CPT | Performed by: FAMILY MEDICINE

## 2024-12-09 PROCEDURE — 1036F TOBACCO NON-USER: CPT | Performed by: FAMILY MEDICINE

## 2024-12-09 RX ORDER — TIZANIDINE 2 MG/1
2 TABLET ORAL EVERY 6 HOURS PRN
Qty: 30 TABLET | Refills: 0 | Status: SHIPPED | OUTPATIENT
Start: 2024-12-09 | End: 2024-12-19

## 2024-12-09 RX ORDER — ONDANSETRON 4 MG/1
4 TABLET, ORALLY DISINTEGRATING ORAL EVERY 8 HOURS PRN
Qty: 20 TABLET | Refills: 2 | Status: SHIPPED | OUTPATIENT
Start: 2024-12-09

## 2024-12-09 RX ORDER — UBROGEPANT 100 MG/1
100 TABLET ORAL ONCE AS NEEDED
COMMUNITY
Start: 2024-11-11

## 2024-12-09 ASSESSMENT — PAIN SCALES - GENERAL: PAINLEVEL_OUTOF10: 0-NO PAIN

## 2024-12-09 ASSESSMENT — LIFESTYLE VARIABLES
SKIP TO QUESTIONS 9-10: 1
AUDIT-C TOTAL SCORE: 1
HOW OFTEN DO YOU HAVE SIX OR MORE DRINKS ON ONE OCCASION: NEVER
HOW OFTEN DO YOU HAVE A DRINK CONTAINING ALCOHOL: MONTHLY OR LESS
HOW MANY STANDARD DRINKS CONTAINING ALCOHOL DO YOU HAVE ON A TYPICAL DAY: 1 OR 2

## 2024-12-09 ASSESSMENT — PATIENT HEALTH QUESTIONNAIRE - PHQ9
1. LITTLE INTEREST OR PLEASURE IN DOING THINGS: NOT AT ALL
SUM OF ALL RESPONSES TO PHQ9 QUESTIONS 1 AND 2: 0
2. FEELING DOWN, DEPRESSED OR HOPELESS: NOT AT ALL

## 2024-12-09 NOTE — PROGRESS NOTES
History Of Present Illness  Zoila Bledsoe is a 39 y.o. female presenting for Annual Exam (Np est care)  .    HPI     Health maintenance: Last physical 2023, last labs in 2019 during pregnancy. Last pap 2023, sees OBGYN. UTD with vaccinations. Work is sedentary but does yoga for exercise.Diet balanced.    Has chronic right knee pain. Walks 3 flight stairs at home.   Has intermittent headaches, improved with ubrelvy as  needed. Ubrelvy is effective so she hardly gets nausea.   History of situational anxiety; uses propranolol as needed. Not on preventative since high school. Mood controlled.    Past Medical History  Patient Active Problem List    Diagnosis Date Noted    Migraine headache 2023    Myofascial pain syndrome 2023    Right knee pain 2023    Situational anxiety 2023    Stress reaction, emotional 2023        Medications  Current Outpatient Medications   Medication Sig Dispense Refill    ibuprofen 400 mg tablet Take 1 tablet (400 mg) by mouth every 6 hours.      levonorgestrel (Mirena) 21 mcg/24 hours (8 yrs) 52 mg IUD 52 mg by intrauterine route 1 time.      naproxen (EC Naprosyn) 500 mg EC tablet Take 1 tablet (500 mg) by mouth 2 times daily (morning and late afternoon). Do not crush, chew, or split. 60 tablet 0    propranolol (Inderal) 10 mg tablet Take 1 tablet (10 mg) by mouth 3 times a day. 90 tablet 2    Ubrelvy 100 mg tablet tablet Take 1 tablet (100 mg) by mouth 1 time if needed.      ondansetron ODT (Zofran-ODT) 4 mg disintegrating tablet Dissolve 1 tablet (4 mg) in the mouth every 8 hours if needed for nausea or vomiting. 20 tablet 2    tiZANidine (Zanaflex) 2 mg tablet Take 1 tablet (2 mg) by mouth every 6 hours if needed for muscle spasms for up to 10 days. 30 tablet 0     No current facility-administered medications for this visit.        Surgical History  She has a past surgical history that includes  section, low transverse and Ankle surgery (Right).    "  Social History  She reports that she has quit smoking. Her smoking use included cigarettes. She has been exposed to tobacco smoke. She has never used smokeless tobacco. She reports current alcohol use. She reports that she does not use drugs.    Family History  Family History   Problem Relation Name Age of Onset    Osteoarthritis Mother      Hypertension Mother      Heart attack Father  50    Hyperlipidemia Father          Allergies  Codeine    Immunizations  Immunization History   Administered Date(s) Administered    COVID-19, mRNA, LNP-S, PF, 30 mcg/0.3 mL dose 03/29/2021, 04/19/2021, 11/05/2021    Flu vaccine (IIV4), preservative free *Check age/dose* 10/19/2018, 09/17/2021, 09/21/2023    Flu vaccine, quadrivalent, no egg protein, age 6 month or greater (FLUCELVAX) 09/12/2022    Flu vaccine, trivalent, preservative free, no egg protein, age 6 months or greater (Flucelvax) 09/01/2024    Pfizer COVID-19 vaccine, 12 years and older, (30mcg/0.3mL) (Comirnaty) 09/21/2023, 09/01/2024    Pfizer COVID-19 vaccine, bivalent, age 12 years and older (30 mcg/0.3 mL) 09/12/2022    Tdap vaccine, age 7 year and older (BOOSTRIX, ADACEL) 04/10/2019        ROS  Negative, except as discussed in HPI     Vitals  /70   Pulse 66   Temp 36.7 °C (98.1 °F)   Ht 1.651 m (5' 5\")   Wt 67.4 kg (148 lb 9.6 oz)   SpO2 98%   BMI 24.73 kg/m²      Physical Exam  Vitals and nursing note reviewed.   Constitutional:       Appearance: Normal appearance.   HENT:      Head: Normocephalic.      Right Ear: Tympanic membrane normal.      Left Ear: Tympanic membrane normal.      Nose: Nose normal.      Mouth/Throat:      Mouth: Mucous membranes are moist.   Eyes:      Extraocular Movements: Extraocular movements intact.      Conjunctiva/sclera: Conjunctivae normal.      Pupils: Pupils are equal, round, and reactive to light.      Comments: Wears eyeglasses   Cardiovascular:      Rate and Rhythm: Normal rate and regular rhythm.      Heart " "sounds: Normal heart sounds.   Pulmonary:      Effort: Pulmonary effort is normal. No respiratory distress.      Breath sounds: Normal breath sounds.   Abdominal:      General: Abdomen is flat.      Palpations: Abdomen is soft.      Tenderness: There is no abdominal tenderness.   Musculoskeletal:      Cervical back: Neck supple.   Lymphadenopathy:      Cervical: No cervical adenopathy.   Skin:     General: Skin is warm and dry.      Findings: No rash.   Neurological:      General: No focal deficit present.      Mental Status: She is alert. Mental status is at baseline.      Coordination: Coordination normal.      Gait: Gait normal.      Deep Tendon Reflexes: Reflexes normal.   Psychiatric:         Mood and Affect: Mood normal.         Behavior: Behavior normal.         Relevant Results  Lab Results   Component Value Date    WBC 8.2 10/10/2019    WBC 7.1 10/10/2019    HGB 11.5 (L) 10/10/2019    HGB 13.5 10/10/2019    HCT 35.1 (L) 10/10/2019    HCT 40.8 10/10/2019    MCV 85 10/10/2019    MCV 86 10/10/2019     10/10/2019     10/10/2019     Lab Results   Component Value Date     10/10/2019    K 4.7 10/10/2019     10/10/2019    CO2 26 10/10/2019    BUN 17 10/10/2019    CREATININE 0.78 10/10/2019    CALCIUM 10.4 10/10/2019    PROT 7.9 10/10/2019    BILITOT 0.6 10/10/2019    ALKPHOS 94 10/10/2019    ALT 14 10/10/2019    AST 16 10/10/2019    GLUCOSE 80 10/10/2019     No results found for: \"HGBA1C\"  No results found for: \"TSH\", \"FREET4\"   No results found for: \"CHOL\", \"TRIG\", \"HDL\", \"LDLDIRECT\"        Assessment/Plan   Zoila was seen today for annual exam.  Diagnoses and all orders for this visit:  Annual physical exam (Primary)  -     Comprehensive Metabolic Panel; Future  -     Lipid Panel; Future  -     TSH with reflex to Free T4 if abnormal; Future  -     CBC; Future  -     Hemoglobin A1C; Future  Chronic pain of right knee  -     Referral to Physical Therapy; Future  Periodic headache " syndrome, not intractable  -     ondansetron ODT (Zofran-ODT) 4 mg disintegrating tablet; Dissolve 1 tablet (4 mg) in the mouth every 8 hours if needed for nausea or vomiting.  -     tiZANidine (Zanaflex) 2 mg tablet; Take 1 tablet (2 mg) by mouth every 6 hours if needed for muscle spasms for up to 10 days.         Counseling:   Medication education:   -Education:  The patient is counseled regarding potential side-effects of any and all new medications  -Understanding:  Patient expressed understanding of information discussed today  -Adherence:  No barriers to adherence identified    Final treatment plan is a result of shared decision making with patient.         Jones Salinas MD

## 2025-01-14 ENCOUNTER — EVALUATION (OUTPATIENT)
Dept: PHYSICAL THERAPY | Facility: CLINIC | Age: 40
End: 2025-01-14
Payer: COMMERCIAL

## 2025-01-14 DIAGNOSIS — M25.562 CHRONIC PAIN OF BOTH KNEES: Primary | ICD-10-CM

## 2025-01-14 DIAGNOSIS — G89.29 CHRONIC PAIN OF BOTH KNEES: Primary | ICD-10-CM

## 2025-01-14 DIAGNOSIS — M25.561 CHRONIC PAIN OF BOTH KNEES: Primary | ICD-10-CM

## 2025-01-14 PROCEDURE — 97161 PT EVAL LOW COMPLEX 20 MIN: CPT | Mod: GP

## 2025-01-14 PROCEDURE — 97110 THERAPEUTIC EXERCISES: CPT | Mod: GP

## 2025-01-14 ASSESSMENT — ENCOUNTER SYMPTOMS
LOSS OF SENSATION IN FEET: 0
DEPRESSION: 0
OCCASIONAL FEELINGS OF UNSTEADINESS: 0

## 2025-01-14 NOTE — PROGRESS NOTES
Physical Therapy Evaluation    Patient Name: Zoila Bledsoe  MRN: [id number]  Today's Date: 1/14/2025  Time Calculation  Start Time: 0933  Stop Time: 1018  Time Calculation (min): 45 min  PT Evaluation Time Entry  PT Evaluation (Low) Time Entry: 22  PT Therapeutic Procedures Time Entry  Therapeutic Exercise Time Entry: 23        Insurance: MMO  Plan of Care: 1/14/25 to 4/14/25  Visit #1    Referring MD Jones Carlisle  Imaging Performed None    Assessment     Zoila Bledsoe is a 39 y.o. referred for bilateral knee pain. Patient demonstrates decreased R more than L glute strength, decreased B quad and hamstring strength, decreased B ankle dorsiflexion, altered squat mechanics, and pain. At this time, patient is limited with stair climbing, practicing yoga, running, and generally exercising. Patient will benefit from physical therapy services to address stated impairments and improve functional mobility.    Plan  Treatment/Interventions: Cryotherapy, Dry needling, Education/ Instruction, Gait training, Hot pack, Manual therapy, Neuromuscular re-education, Self care/ home management, Therapeutic activities, Therapeutic exercises  PT Plan: Skilled PT  PT Frequency:  (every few weeks)  Duration: 4-6 visits  Onset Date: 01/01/21  Certification Period Start Date: 01/14/25  Certification Period End Date: 04/14/25  Number of Treatments Authorized: 10  Rehab Potential: Good  Plan of Care Agreement: Patient    Primary diagnosis: Chronic pain of both knees  Current Problem  1. Chronic pain of both knees  Referral to Physical Therapy    Follow Up In Physical Therapy          General:  General  Reason for Referral: B knee pain  Referred By: Jones Carlisle  Past Medical History Relevant to Rehab: a few sessions of PT in 2021  Preferred Learning Style: verbal, visual, written  Precautions:  Precautions  STEADI Fall Risk Score (The score of 4 or more indicates an increased risk of falling): 0  Precautions Comment: None  Vital Signs:        Subjective:  Chief complaints: R and now L knee pain  Onset/Surgery Date: 3-4 years ago  Mechanism of Injury: insidious onset, worsening over time  Previous History: PT a few years ago  Personal Factors that may impact care: None     Pain:  Current: 0/10 Best: 0/10 Worst: 8/10   Location: Anterior knee   Type: sharp    Aggravators: stairs, lunging, carrying heavy things, running   Alleviators: ice, ibuprofen   Numbness/tingling? No    Function:   Work/Recreation:    Prior Level: Full, hasn't done running/yoga in several months   Current limitations: Running, yoga, stairs, carrying heavy things, driving when its aggravated   Condition: Worsening     Home Situation: multi-story home   Stairs: yes, 3 floors and basement   Lives with , kids   No concerns about home set up No   Any falls in the past year No     Injuries? N/a    Fear of falling? No    Sleep:    Getting to sleep Yes, if its swollen   Disturbed No     Goals for Therapy:    Strengthen to be able to run and do yoga    Objective   Palpation: no TTP around knee, no crepitus noted     ROM/Flexibility:    Knee Flexion R / L :      148 / 148    Knee Extension R / L :  1 / 5      Hip ER R / L : 37 / 35    Hip IR R / L : 33 / 40        Ankle DF R / L: 7, compensatory R knee flex / 5        Hamstring R / L : 80 / 80     Strength R / L :    Hip Flexion:     5 / 5    Hip Extension:     4- / 4     Glute Med:     4 / 4+    Glute Min:        4 / 4+    Hip Adduction:    5 / 5    Hip ER:      5 / 5    Hip IR:      5 / 5    Knee Extension:   4 / 4+    Knee Flexion:       4+ / 4    Ankle DF:             5 / 5    Ankle PF:              5 / 5     Neurological: Pt endorses intact/symmetrical BLE sensation     Gait: Increased pronation R vs L     Squat: increased trunk flexion/hip flexion, + R knee pain   Single leg squat: min depth B, R > L pain, femoral adduction noted bilaterally     Balance: SLS 30 sec R /L      Special Tests R / L :     Anterior  Drawer:    - / -    Lachman:               - / -     Posterior Drawer:   - / -     Posterior Sag:         - / -    Varus @ 0` :            - / -    Varus @ 30` :          - / -    Valgus @ 0` :          - / -    Valgus @ 30` :        - / -     Julio:             - / -      Outcome Measure:    LEFS : 64 / 80      Treatment:  Therapeutic Exercise   Sidelying clam 5 sec x 10 R/L   Bridge w/heel raise x 10    Prone hip ext w/knee flex 3-5 sec x 10 R/L   Psoas march x 7-10 ea R/L   Lat amb w/heels up x 3 laps (red at ankles)   Wall quarter squat x 10   Seated soleus raises x 20 ea 15# on knee    Goals:  Active       PT Problem       Patient will be independent with home exercise program for home maintenance.        Start:  01/14/25    Expected End:  02/28/25            Pt will be able to perform 10 free squats with good form and less than 1-2/10 pain in bilateral knees to indicate improving LE strength.        Start:  01/14/25    Expected End:  02/28/25            Pt will increase BLE strength to 5/5 to facilitate improved ability to negotiate steps and progress strength program.        Start:  01/14/25    Expected End:  03/30/25            The patient will improve score on LEFS by 10 points to indicate decreased pain and increased functional level.         Start:  01/14/25    Expected End:  03/30/25

## 2025-02-03 ENCOUNTER — TREATMENT (OUTPATIENT)
Dept: PHYSICAL THERAPY | Facility: CLINIC | Age: 40
End: 2025-02-03
Payer: COMMERCIAL

## 2025-02-03 DIAGNOSIS — G89.29 CHRONIC PAIN OF BOTH KNEES: ICD-10-CM

## 2025-02-03 DIAGNOSIS — M25.561 CHRONIC PAIN OF BOTH KNEES: ICD-10-CM

## 2025-02-03 DIAGNOSIS — M25.562 CHRONIC PAIN OF BOTH KNEES: ICD-10-CM

## 2025-02-03 PROCEDURE — 97110 THERAPEUTIC EXERCISES: CPT | Mod: GP

## 2025-02-03 NOTE — PROGRESS NOTES
"Physical Therapy    Physical Therapy Treatment    Patient Name: Zoila Bledsoe  MRN: [id number]  Today's Date: 2/3/2025    Time Entry:   Time Calculation  Start Time: 0815  Stop Time: 0857  Time Calculation (min): 42 min     PT Therapeutic Procedures Time Entry  Therapeutic Exercise Time Entry: 42                   Assessment:   Swapped wall squats for heel tap, which she was able to do without knee pain and thinks she can set up at home.     Plan:   Try mini squats at rail or squat to elevated surface; SL or DL RDL    Current Problem  1. Chronic pain of both knees  Follow Up In Physical Therapy          Subjective    \"Doing okay, the wall squats were pretty consistently uncomfortable on the knees\"  Pain   0/10    Objective   Depth to 90 degrees with squats on shuttle    Treatments:  Therapeutic Exercise   Psoas march x 10 ea R/L (red)   Bridge w/heel raise x 10   Bridge w/heel raise + step out x 10   Sidelying clam 5 sec x 10 R/L   Prone hip ext w/knee flex 5 sec x 10 R/L   4\" heel tap 2 x 10 ea    Lat amb x 3 laps (red at ankles)   Monster walks x 3 laps (red at ankles)   Wall sit w/calf raise 3 x 10   Hip abd/flex wall slide x 20 ea    Ccamb retro 6 plates x 5    Ccamb forward 5 plates x 5    SL calf raises 2-3 x 10 ea   Shuttle 2B DL squat 2 x 10   Slant board stretch 2 x 30 sec   Supine belt HS stretch x 1 min ea    OP EDUCATION:       Goals:  Active       PT Problem       Patient will be independent with home exercise program for home maintenance.        Start:  01/14/25    Expected End:  02/28/25            Pt will be able to perform 10 free squats with good form and less than 1-2/10 pain in bilateral knees to indicate improving LE strength.        Start:  01/14/25    Expected End:  02/28/25            Pt will increase BLE strength to 5/5 to facilitate improved ability to negotiate steps and progress strength program.        Start:  01/14/25    Expected End:  03/30/25            The patient will improve score on " LEFS by 10 points to indicate decreased pain and increased functional level.         Start:  01/14/25    Expected End:  03/30/25

## 2025-02-19 DIAGNOSIS — G43.E09 CHRONIC MIGRAINE WITH AURA WITHOUT STATUS MIGRAINOSUS, NOT INTRACTABLE: ICD-10-CM

## 2025-02-26 ENCOUNTER — APPOINTMENT (OUTPATIENT)
Dept: PHYSICAL THERAPY | Facility: CLINIC | Age: 40
End: 2025-02-26
Payer: COMMERCIAL

## 2025-04-02 ENCOUNTER — APPOINTMENT (OUTPATIENT)
Dept: OPHTHALMOLOGY | Facility: CLINIC | Age: 40
End: 2025-04-02
Payer: COMMERCIAL

## 2025-05-07 ENCOUNTER — APPOINTMENT (OUTPATIENT)
Dept: OPHTHALMOLOGY | Facility: CLINIC | Age: 40
End: 2025-05-07
Payer: COMMERCIAL

## 2025-05-07 DIAGNOSIS — H52.223 REGULAR ASTIGMATISM OF BOTH EYES: ICD-10-CM

## 2025-05-07 DIAGNOSIS — G43.801 OTHER MIGRAINE WITH STATUS MIGRAINOSUS, NOT INTRACTABLE: Primary | ICD-10-CM

## 2025-05-07 PROBLEM — T37.8X5A HYDROXYCHLOROQUINE CAUSING ADVERSE EFFECT IN THERAPEUTIC USE: Status: ACTIVE | Noted: 2025-05-07

## 2025-05-07 PROBLEM — G36.0 NEUROMYELITIS OPTICA (MULTI): Status: ACTIVE | Noted: 2025-05-07

## 2025-05-07 PROCEDURE — 92004 COMPRE OPH EXAM NEW PT 1/>: CPT | Performed by: OPTOMETRIST

## 2025-05-07 PROCEDURE — 92015 DETERMINE REFRACTIVE STATE: CPT | Performed by: OPTOMETRIST

## 2025-05-07 ASSESSMENT — CONF VISUAL FIELD
OD_INFERIOR_NASAL_RESTRICTION: 0
OD_INFERIOR_TEMPORAL_RESTRICTION: 0
OS_NORMAL: 1
METHOD: COUNTING FINGERS
OD_SUPERIOR_TEMPORAL_RESTRICTION: 0
OS_INFERIOR_NASAL_RESTRICTION: 0
OS_INFERIOR_TEMPORAL_RESTRICTION: 0
OS_SUPERIOR_TEMPORAL_RESTRICTION: 0
OS_SUPERIOR_NASAL_RESTRICTION: 0
OD_SUPERIOR_NASAL_RESTRICTION: 0
OD_NORMAL: 1

## 2025-05-07 ASSESSMENT — REFRACTION_MANIFEST
OS_SPHERE: +0.75
OD_CYLINDER: -1.25
OD_AXIS: 175
OS_AXIS: 175
OD_AXIS: 175
OS_CYLINDER: -1.75
OD_SPHERE: +0.00
OD_SPHERE: +0.50
OD_CYLINDER: -1.75
OS_CYLINDER: -2.00
OS_SPHERE: +0.25
METHOD_AUTOREFRACTION: 1
OS_AXIS: 175

## 2025-05-07 ASSESSMENT — ENCOUNTER SYMPTOMS
NEUROLOGICAL NEGATIVE: 1
ENDOCRINE NEGATIVE: 0
MUSCULOSKELETAL NEGATIVE: 0
EYES NEGATIVE: 0
PSYCHIATRIC NEGATIVE: 0
CARDIOVASCULAR NEGATIVE: 0
RESPIRATORY NEGATIVE: 0
ALLERGIC/IMMUNOLOGIC NEGATIVE: 0
GASTROINTESTINAL NEGATIVE: 0
HEMATOLOGIC/LYMPHATIC NEGATIVE: 0
CONSTITUTIONAL NEGATIVE: 0

## 2025-05-07 ASSESSMENT — EXTERNAL EXAM - LEFT EYE: OS_EXAM: NORMAL

## 2025-05-07 ASSESSMENT — VISUAL ACUITY
OD_SC+: -3
OS_SC+: +2
OD_SC: 20/25
OS_SC: 20/30
METHOD: SNELLEN - LINEAR

## 2025-05-07 ASSESSMENT — TONOMETRY
OS_IOP_MMHG: 16
IOP_METHOD: GOLDMANN APPLANATION
OD_IOP_MMHG: 16

## 2025-05-07 ASSESSMENT — CUP TO DISC RATIO
OS_RATIO: 0.1
OD_RATIO: 0.1

## 2025-05-07 ASSESSMENT — SLIT LAMP EXAM - LIDS
COMMENTS: NORMAL
COMMENTS: NORMAL

## 2025-05-07 ASSESSMENT — EXTERNAL EXAM - RIGHT EYE: OD_EXAM: NORMAL

## 2025-05-07 NOTE — PROGRESS NOTES
Migraine headaches without aura. Dr. Cata Sharma neurologist. Retina and optic nerve normal and healthy    Astigmatism. A spectacle prescription was dispensed to be used as needed.     The patient was asked to return to our clinic in one year or sooner if ocular or vision changes occur.

## 2025-07-07 ENCOUNTER — APPOINTMENT (OUTPATIENT)
Dept: NEUROLOGY | Facility: CLINIC | Age: 40
End: 2025-07-07
Payer: COMMERCIAL

## 2025-07-07 VITALS
HEART RATE: 63 BPM | WEIGHT: 157.8 LBS | BODY MASS INDEX: 26.29 KG/M2 | RESPIRATION RATE: 14 BRPM | SYSTOLIC BLOOD PRESSURE: 115 MMHG | DIASTOLIC BLOOD PRESSURE: 80 MMHG | TEMPERATURE: 97.5 F | HEIGHT: 65 IN

## 2025-07-07 DIAGNOSIS — G43.001 MIGRAINE WITHOUT AURA AND WITH STATUS MIGRAINOSUS, NOT INTRACTABLE: Primary | ICD-10-CM

## 2025-07-07 PROCEDURE — 1036F TOBACCO NON-USER: CPT | Performed by: PSYCHIATRY & NEUROLOGY

## 2025-07-07 PROCEDURE — 3008F BODY MASS INDEX DOCD: CPT | Performed by: PSYCHIATRY & NEUROLOGY

## 2025-07-07 PROCEDURE — 99215 OFFICE O/P EST HI 40 MIN: CPT | Performed by: PSYCHIATRY & NEUROLOGY

## 2025-07-07 ASSESSMENT — ENCOUNTER SYMPTOMS
DEPRESSION: 0
OCCASIONAL FEELINGS OF UNSTEADINESS: 0
HEADACHES: 1
LOSS OF SENSATION IN FEET: 0

## 2025-07-07 ASSESSMENT — PAIN SCALES - GENERAL: PAINLEVEL_OUTOF10: 0-NO PAIN

## 2025-07-07 NOTE — PATIENT INSTRUCTIONS
The patient is doing very well from a neurological standpoint as her headaches are well-controlled.  She can use Ubrelvy on an as needed basis.  The patient should avoid headache triggers.  I discussed all these issues in detail with the patient and answered all of her questions.  The patient can follow-up with me on an as-needed basis.

## 2025-07-07 NOTE — PROGRESS NOTES
Subjective     Zoila Paigesa 39 y.o.  HPI  The patient states that she is having about 1-2 headaches per month.  She feels that her triggers include dehydration and when she is not wearing her glasses.    She describes her headaches as dull and then becomes sharp.  She states that they start on the right side of her neck and radiate to the right side of her head.  The patient states that she takes Ubrelvy when the headache is coming on and that it does abort the headache almost every time.  She states that she is no longer having nausea or vomiting with the headache as Ubrelvy is interrupting the cycle prior to the onset of the nausea and vomiting.  She denies any photo or phonophobia.    The patient is MRI of the brain and MRA of the brain were both normal.    Review of Systems   Neurological:  Positive for headaches.   All other systems reviewed and are negative.       Problem List[1]     Medical History[2]     Surgical History[3]     Social History     Socioeconomic History    Marital status:      Spouse name: Not on file    Number of children: Not on file    Years of education: Not on file    Highest education level: Not on file   Occupational History    Not on file   Tobacco Use    Smoking status: Former     Types: Cigarettes     Passive exposure: Past    Smokeless tobacco: Never   Vaping Use    Vaping status: Never Used   Substance and Sexual Activity    Alcohol use: Yes     Comment: occasionally    Drug use: Never    Sexual activity: Yes     Partners: Male     Birth control/protection: I.U.D.   Other Topics Concern    Not on file   Social History Narrative    Not on file     Social Drivers of Health     Financial Resource Strain: Not on file   Food Insecurity: Not on file   Transportation Needs: Not on file   Physical Activity: Not on file   Stress: Not on file   Social Connections: Not on file   Intimate Partner Violence: Not on file   Housing Stability: Not on file        Family History[4]     Current  "Outpatient Medications   Medication Instructions    ibuprofen 400 mg, Every 6 hours    levonorgestrel (Mirena) 21 mcg/24 hours (8 yrs) 52 mg IUD 1 each, Once    ondansetron ODT (ZOFRAN-ODT) 4 mg, oral, Every 8 hours PRN    propranolol (INDERAL) 10 mg, oral, 3 times daily    tiZANidine (ZANAFLEX) 2 mg, oral, Every 6 hours PRN    ubrogepant (UBRELVY) 100 mg, oral, Once as needed        RX Allergies[5]     Objective  /80   Pulse 63   Temp 36.4 °C (97.5 °F) (Temporal)   Resp 14   Ht 1.651 m (5' 5\")   Wt 71.6 kg (157 lb 12.8 oz)   BMI 26.26 kg/m²    GENERAL APPEARANCE:  No distress, alert and cooperative.     MENTAL STATE:  Orientation was normal to time, place and person. Recent and remote memory was intact.  Attention span and concentration were normal. Language testing was normal for comprehension, repetition, expression, and naming. Calculation was intact. The patient could correctly interpret a picture, and copy a diagram. General fund of knowledge was intact.     CRANIAL NERVES:  Cranial nerves were normal.      CN 2- Visual Acuity  OD: 20/20 (corrected)   OS: 20/20 (corrected); visual fields full to confrontation.      CN 3, 4, 6-  Pupils round, 4 mm in diameter, equally reactive to light. No ptosis. EOMs normal alignment, full range of movement, no nystagmus     CN 5- Facial sensation intact bilaterally. Normal corneal reflexes.      CN 7- Normal and symmetric facial strength. Nasolabial folds symmetric.     CN 8- Hearing intact to finger rub, whisper.      CN 9- Palate elevates symmetrically. Normal gag reflex.      CN 11- Normal strength of shoulder shrug and neck turning      CN 12- Tongue midline, with normal bulk and strength; no fasciculations.     MOTOR:  Motor exam was normal. Muscle bulk and tone were normal in both upper and lower extremities. Muscle strength was 5/5 in distal and proximal muscles in both upper and lower extremities. No fasciculations, tremor or other abnormal movements were " present.     GAIT: Station was stable with a normal base and negative Romberg sign. Gait was stable with a normal arm swing and speed. No ataxia, shuffling, steppage or waddling was noted. Tandem gait was intact. Postural reflexes were normal.     Assessment/Plan   The patient is doing very well from a neurological standpoint as her headaches are well-controlled.  She can use Ubrelvy on an as needed basis.  The patient should avoid headache triggers.  I discussed all these issues in detail with the patient and answered all of her questions.  The patient can follow-up with me on an as-needed basis.       [1]   Patient Active Problem List  Diagnosis    Migraine headache    Myofascial pain syndrome    Right knee pain    Situational anxiety    Stress reaction, emotional    Hydroxychloroquine causing adverse effect in therapeutic use    Neuromyelitis optica (Multi)    Regular astigmatism of both eyes   [2]   Past Medical History:  Diagnosis Date    13 weeks gestation of pregnancy ([facility]) 2018    13 weeks gestation of pregnancy    21 weeks gestation of pregnancy ([facility]) 2019    21 weeks gestation of pregnancy    25 weeks gestation of pregnancy ([facility]) 2019    25 weeks gestation of pregnancy    30 weeks gestation of pregnancy ([facility]) 2019    30 weeks gestation of pregnancy    36 weeks gestation of pregnancy ([facility]) 2019    36 weeks gestation of pregnancy    39 weeks gestation of pregnancy ([facility]) 2019    39 weeks gestation of pregnancy    Abnormal Pap smear of cervix 2023    Contact with and (suspected) exposure to covid-19 2021    Exposure to 2019 novel coronavirus    Encounter for  screening for Streptococcus B 2019     screening for streptococcus B    Encounter for immunization 04/10/2019    Need for Tdap vaccination    Encounter for insertion of intrauterine contraceptive device 2019    Encounter for insertion of mirena IUD     Encounter for pregnancy test, result positive ([facility]) 10/19/2018    Positive urine pregnancy test    Encounter for routine postpartum follow-up 07/08/2019    Encounter for routine postpartum follow-up    Encounter for screening for malignant neoplasm of cervix 08/31/2022    Cervical cancer screening    Encounter for supervision of normal first pregnancy, first trimester 11/27/2018    Encounter for supervision of normal first pregnancy in first trimester    Encounter for supervision of normal first pregnancy, second trimester 02/20/2019    Encounter for supervision of normal first pregnancy in second trimester    Encounter for supervision of normal first pregnancy, second trimester 12/26/2018    Pregnancy, first, second trimester    Encounter for supervision of normal first pregnancy, third trimester 05/29/2019    Encounter for supervision of normal first pregnancy in third trimester    Encounter for supervision of normal first pregnancy, unspecified trimester ([facility]) 01/22/2019    Encounter for supervision of normal first pregnancy    Less than 8 weeks gestation of pregnancy ([facility]) 10/19/2018    7 weeks gestation of pregnancy    Other conditions influencing health status 09/11/2019    History of dyspareunia    Other conditions influencing health status 02/20/2019    Normal breast feeding    Other conditions influencing health status 03/18/2021    History of cough    Ovarian cyst, complex 09/16/2023    Pain in unspecified knee 02/03/2020    Acute knee pain    Personal history of other diseases of the musculoskeletal system and connective tissue 12/12/2018    History of neck pain    Personal history of other drug therapy 10/19/2018    History of influenza vaccination    Personal history of urinary (tract) infections 01/06/2021    History of urinary tract infection    Vomiting of pregnancy, unspecified 10/19/2018    Nausea/vomiting in pregnancy   [3]   Past Surgical History:  Procedure Laterality Date    ANKLE  SURGERY Right      SECTION, LOW TRANSVERSE     [4]   Family History  Problem Relation Name Age of Onset    Osteoarthritis Mother      Hypertension Mother      Glaucoma Mother      Cataracts Mother      Heart attack Father  50    Hyperlipidemia Father     [5]   Allergies  Allergen Reactions    Codeine Nausea/vomiting

## 2025-08-25 DIAGNOSIS — G43.E09 CHRONIC MIGRAINE WITH AURA WITHOUT STATUS MIGRAINOSUS, NOT INTRACTABLE: ICD-10-CM

## 2026-05-11 ENCOUNTER — APPOINTMENT (OUTPATIENT)
Dept: OPHTHALMOLOGY | Facility: CLINIC | Age: 41
End: 2026-05-11
Payer: COMMERCIAL